# Patient Record
Sex: FEMALE | Race: BLACK OR AFRICAN AMERICAN | NOT HISPANIC OR LATINO | URBAN - METROPOLITAN AREA
[De-identification: names, ages, dates, MRNs, and addresses within clinical notes are randomized per-mention and may not be internally consistent; named-entity substitution may affect disease eponyms.]

---

## 2021-01-01 ENCOUNTER — INPATIENT (INPATIENT)
Facility: HOSPITAL | Age: 86
LOS: 0 days | End: 2021-12-16
Attending: INTERNAL MEDICINE | Admitting: INTERNAL MEDICINE
Payer: MEDICARE

## 2021-01-01 VITALS
DIASTOLIC BLOOD PRESSURE: 88 MMHG | TEMPERATURE: 97 F | HEIGHT: 62 IN | HEART RATE: 135 BPM | WEIGHT: 130.07 LBS | SYSTOLIC BLOOD PRESSURE: 118 MMHG | OXYGEN SATURATION: 96 % | RESPIRATION RATE: 26 BRPM

## 2021-01-01 DIAGNOSIS — E78.5 HYPERLIPIDEMIA, UNSPECIFIED: ICD-10-CM

## 2021-01-01 DIAGNOSIS — I23.5: ICD-10-CM

## 2021-01-01 DIAGNOSIS — I21.19 ST ELEVATION (STEMI) MYOCARDIAL INFARCTION INVOLVING OTHER CORONARY ARTERY OF INFERIOR WALL: ICD-10-CM

## 2021-01-01 DIAGNOSIS — R57.0 CARDIOGENIC SHOCK: ICD-10-CM

## 2021-01-01 DIAGNOSIS — I21.3 ST ELEVATION (STEMI) MYOCARDIAL INFARCTION OF UNSPECIFIED SITE: ICD-10-CM

## 2021-01-01 DIAGNOSIS — J96.01 ACUTE RESPIRATORY FAILURE WITH HYPOXIA: ICD-10-CM

## 2021-01-01 DIAGNOSIS — I34.0 NONRHEUMATIC MITRAL (VALVE) INSUFFICIENCY: ICD-10-CM

## 2021-01-01 DIAGNOSIS — Z66 DO NOT RESUSCITATE: ICD-10-CM

## 2021-01-01 DIAGNOSIS — I10 ESSENTIAL (PRIMARY) HYPERTENSION: ICD-10-CM

## 2021-01-01 DIAGNOSIS — Z51.5 ENCOUNTER FOR PALLIATIVE CARE: ICD-10-CM

## 2021-01-01 DIAGNOSIS — J81.1 CHRONIC PULMONARY EDEMA: ICD-10-CM

## 2021-01-01 LAB
ALBUMIN SERPL ELPH-MCNC: 3.6 G/DL — SIGNIFICANT CHANGE UP (ref 3.3–5)
ALP SERPL-CCNC: 88 U/L — SIGNIFICANT CHANGE UP (ref 40–120)
ALT FLD-CCNC: 67 U/L — SIGNIFICANT CHANGE UP (ref 12–78)
ANION GAP SERPL CALC-SCNC: 15 MMOL/L — SIGNIFICANT CHANGE UP (ref 5–17)
AST SERPL-CCNC: 247 U/L — HIGH (ref 15–37)
BASE EXCESS BLDA CALC-SCNC: -6.4 MMOL/L — LOW (ref -2–3)
BASOPHILS # BLD AUTO: 0.01 K/UL — SIGNIFICANT CHANGE UP (ref 0–0.2)
BASOPHILS NFR BLD AUTO: 0.1 % — SIGNIFICANT CHANGE UP (ref 0–2)
BILIRUB SERPL-MCNC: 0.9 MG/DL — SIGNIFICANT CHANGE UP (ref 0.2–1.2)
BLOOD GAS COMMENTS: SIGNIFICANT CHANGE UP
BLOOD GAS COMMENTS: SIGNIFICANT CHANGE UP
BUN SERPL-MCNC: 39 MG/DL — HIGH (ref 7–23)
CALCIUM SERPL-MCNC: 9.5 MG/DL — SIGNIFICANT CHANGE UP (ref 8.5–10.1)
CHLORIDE SERPL-SCNC: 107 MMOL/L — SIGNIFICANT CHANGE UP (ref 96–108)
CK MB BLD-MCNC: 15.4 % — HIGH (ref 0–3.5)
CK MB CFR SERPL CALC: 203.6 NG/ML — HIGH (ref 0.5–3.6)
CK SERPL-CCNC: 1326 U/L — HIGH (ref 26–192)
CO2 BLDA-SCNC: 19 MMOL/L — SIGNIFICANT CHANGE UP (ref 19–24)
CO2 SERPL-SCNC: 18 MMOL/L — LOW (ref 22–31)
CREAT SERPL-MCNC: 1.5 MG/DL — HIGH (ref 0.5–1.3)
EOSINOPHIL # BLD AUTO: 0 K/UL — SIGNIFICANT CHANGE UP (ref 0–0.5)
EOSINOPHIL NFR BLD AUTO: 0 % — SIGNIFICANT CHANGE UP (ref 0–6)
FLUAV AG NPH QL: SIGNIFICANT CHANGE UP
FLUBV AG NPH QL: SIGNIFICANT CHANGE UP
GLUCOSE SERPL-MCNC: 238 MG/DL — HIGH (ref 70–99)
HCO3 BLDA-SCNC: 18 MMOL/L — LOW (ref 21–28)
HCT VFR BLD CALC: 46.8 % — HIGH (ref 34.5–45)
HGB BLD-MCNC: 14.8 G/DL — SIGNIFICANT CHANGE UP (ref 11.5–15.5)
HOROWITZ INDEX BLDA+IHG-RTO: 100 — SIGNIFICANT CHANGE UP
IMM GRANULOCYTES NFR BLD AUTO: 0.3 % — SIGNIFICANT CHANGE UP (ref 0–1.5)
LYMPHOCYTES # BLD AUTO: 0.58 K/UL — LOW (ref 1–3.3)
LYMPHOCYTES # BLD AUTO: 6.6 % — LOW (ref 13–44)
MCHC RBC-ENTMCNC: 31.1 PG — SIGNIFICANT CHANGE UP (ref 27–34)
MCHC RBC-ENTMCNC: 31.6 GM/DL — LOW (ref 32–36)
MCV RBC AUTO: 98.3 FL — SIGNIFICANT CHANGE UP (ref 80–100)
MONOCYTES # BLD AUTO: 0.33 K/UL — SIGNIFICANT CHANGE UP (ref 0–0.9)
MONOCYTES NFR BLD AUTO: 3.8 % — SIGNIFICANT CHANGE UP (ref 2–14)
NEUTROPHILS # BLD AUTO: 7.81 K/UL — HIGH (ref 1.8–7.4)
NEUTROPHILS NFR BLD AUTO: 89.2 % — HIGH (ref 43–77)
NRBC # BLD: 0 /100 WBCS — SIGNIFICANT CHANGE UP (ref 0–0)
PCO2 BLDA: 33 MMHG — SIGNIFICANT CHANGE UP (ref 32–46)
PH BLD: 7.35 — SIGNIFICANT CHANGE UP (ref 7.35–7.45)
PLATELET # BLD AUTO: 211 K/UL — SIGNIFICANT CHANGE UP (ref 150–400)
PO2 BLDA: 109 MMHG — HIGH (ref 83–108)
POTASSIUM SERPL-MCNC: 4.3 MMOL/L — SIGNIFICANT CHANGE UP (ref 3.5–5.3)
POTASSIUM SERPL-SCNC: 4.3 MMOL/L — SIGNIFICANT CHANGE UP (ref 3.5–5.3)
PROT SERPL-MCNC: 8.7 GM/DL — HIGH (ref 6–8.3)
RBC # BLD: 4.76 M/UL — SIGNIFICANT CHANGE UP (ref 3.8–5.2)
RBC # FLD: 13.8 % — SIGNIFICANT CHANGE UP (ref 10.3–14.5)
SAO2 % BLDA: 99.1 % — HIGH (ref 94–98)
SARS-COV-2 RNA SPEC QL NAA+PROBE: SIGNIFICANT CHANGE UP
SODIUM SERPL-SCNC: 140 MMOL/L — SIGNIFICANT CHANGE UP (ref 135–145)
TROPONIN I, HIGH SENSITIVITY RESULT: SIGNIFICANT CHANGE UP NG/L
WBC # BLD: 8.76 K/UL — SIGNIFICANT CHANGE UP (ref 3.8–10.5)
WBC # FLD AUTO: 8.76 K/UL — SIGNIFICANT CHANGE UP (ref 3.8–10.5)

## 2021-01-01 PROCEDURE — 99291 CRITICAL CARE FIRST HOUR: CPT

## 2021-01-01 PROCEDURE — 93010 ELECTROCARDIOGRAM REPORT: CPT

## 2021-01-01 PROCEDURE — 71045 X-RAY EXAM CHEST 1 VIEW: CPT | Mod: 26

## 2021-01-01 RX ORDER — ASPIRIN/CALCIUM CARB/MAGNESIUM 324 MG
325 TABLET ORAL ONCE
Refills: 0 | Status: COMPLETED | OUTPATIENT
Start: 2021-01-01 | End: 2021-01-01

## 2021-01-01 RX ORDER — AMLODIPINE BESYLATE 2.5 MG/1
1 TABLET ORAL
Qty: 0 | Refills: 0 | DISCHARGE

## 2021-01-01 RX ORDER — ONDANSETRON 8 MG/1
4 TABLET, FILM COATED ORAL ONCE
Refills: 0 | Status: COMPLETED | OUTPATIENT
Start: 2021-01-01 | End: 2021-01-01

## 2021-01-01 RX ORDER — HEPARIN SODIUM 5000 [USP'U]/ML
INJECTION INTRAVENOUS; SUBCUTANEOUS
Qty: 25000 | Refills: 0 | Status: DISCONTINUED | OUTPATIENT
Start: 2021-01-01 | End: 2021-01-01

## 2021-01-01 RX ORDER — ATENOLOL 25 MG/1
1 TABLET ORAL
Qty: 0 | Refills: 0 | DISCHARGE

## 2021-01-01 RX ORDER — MORPHINE SULFATE 50 MG/1
0.5 CAPSULE, EXTENDED RELEASE ORAL
Qty: 100 | Refills: 0 | Status: DISCONTINUED | OUTPATIENT
Start: 2021-01-01 | End: 2021-01-01

## 2021-01-01 RX ORDER — MORPHINE SULFATE 50 MG/1
2 CAPSULE, EXTENDED RELEASE ORAL ONCE
Refills: 0 | Status: DISCONTINUED | OUTPATIENT
Start: 2021-01-01 | End: 2021-01-01

## 2021-01-01 RX ORDER — HEPARIN SODIUM 5000 [USP'U]/ML
3500 INJECTION INTRAVENOUS; SUBCUTANEOUS EVERY 6 HOURS
Refills: 0 | Status: DISCONTINUED | OUTPATIENT
Start: 2021-01-01 | End: 2021-01-01

## 2021-01-01 RX ORDER — HEPARIN SODIUM 5000 [USP'U]/ML
3500 INJECTION INTRAVENOUS; SUBCUTANEOUS ONCE
Refills: 0 | Status: COMPLETED | OUTPATIENT
Start: 2021-01-01 | End: 2021-01-01

## 2021-01-01 RX ORDER — CHLORHEXIDINE GLUCONATE 213 G/1000ML
1 SOLUTION TOPICAL
Refills: 0 | Status: DISCONTINUED | OUTPATIENT
Start: 2021-01-01 | End: 2021-01-01

## 2021-01-01 RX ADMIN — CHLORHEXIDINE GLUCONATE 1 APPLICATION(S): 213 SOLUTION TOPICAL at 15:55

## 2021-01-01 RX ADMIN — ONDANSETRON 4 MILLIGRAM(S): 8 TABLET, FILM COATED ORAL at 15:43

## 2021-01-01 RX ADMIN — MORPHINE SULFATE 2 MILLIGRAM(S): 50 CAPSULE, EXTENDED RELEASE ORAL at 15:30

## 2021-01-01 RX ADMIN — HEPARIN SODIUM 700 UNIT(S)/HR: 5000 INJECTION INTRAVENOUS; SUBCUTANEOUS at 10:38

## 2021-01-01 RX ADMIN — MORPHINE SULFATE 2 MILLIGRAM(S): 50 CAPSULE, EXTENDED RELEASE ORAL at 15:45

## 2021-01-01 RX ADMIN — Medication 325 MILLIGRAM(S): at 10:40

## 2021-01-01 RX ADMIN — HEPARIN SODIUM 3500 UNIT(S): 5000 INJECTION INTRAVENOUS; SUBCUTANEOUS at 10:37

## 2021-01-01 RX ADMIN — ONDANSETRON 4 MILLIGRAM(S): 8 TABLET, FILM COATED ORAL at 11:31

## 2021-12-16 NOTE — DISCHARGE NOTE FOR THE EXPIRED PATIENT - OTHER SIGNIFICANT FINDINGS
ADDENDUM:  Patient was in cardiogenic shock with severe mitral regurgitation. As a result, she had acute respiratory failure requiring NIPPV for pulmonary edema.

## 2021-12-16 NOTE — H&P ADULT - ASSESSMENT
89 yo F with a pmhx of HTN, HLD presents to the ED with b/l shoulder pain and SOB over the last 2 days. Her SOB worsened today, prompting her to come to the ED. She lives at home alone and is able to perform her ADLs. She sees a doctor regularly and only takes atenolol for her HTN. She is un-vaccinated for COVID. She denies any chest pain at bedside. She admits to mild nausea. She denies any abdominal pain, fevers, chills, HA, fnd, numbness or tingling.     Patient was arrived to the ED as a code STEMI, STEMI noted in the inferior wall in leads 2, 3, and AVF. Cardiology consult at NS was made and initial decision to transfer to [facility] was made. In the interim, patient was reassess, cardiology made final decision not to undergo PCI for STEMI given risks outweighs benefits for patient. decision was made to conservatively manage patient. Patient started on heparin drip. Patient was noted to hypoxemic to the 74% on 100% nrb; patient was upgraded to BIPAP satting in the high 90s. BP in the 110s systolic, MAP in the 80s. Patient admitted to CCU.    NEURO:  AAOx3, tolerating BIpap without difficulty  no active issues    CARDIO:  #STEMI  - MAP 80s, maintain MAPS above 70  - Heparin infusion with ptt goals for full AC  - trend trops and ckmb  - telemetry monitoring  - f/u TTE    RESP:  - CXR with R sided fullness, likely pulmonary edema  - covid negative, flu neg  - on BIPAP 12/6 100% 14; wean as tolerate  - CTM    GI:  -        89 yo F with a pmhx of HTN, HLD presents to the ED with b/l shoulder pain and SOB over the last 2 days. Her SOB worsened today, prompting her to come to the ED. She lives at home alone and is able to perform her ADLs. She sees a doctor regularly and only takes atenolol for her HTN. She is un-vaccinated for COVID. She denies any chest pain at bedside. She admits to mild nausea. She denies any abdominal pain, fevers, chills, HA, fnd, numbness or tingling.     Patient was arrived to the ED as a code STEMI, STEMI noted in the inferior wall in leads 2, 3, and AVF. Cardiology consult at NS was made and initial decision to transfer to [facility] was made. In the interim, patient was reassess, cardiology made final decision not to undergo PCI for STEMI given risks outweighs benefits for patient. decision was made to conservatively manage patient. Patient started on heparin drip. Patient was noted to hypoxemic to the 74% on 100% nrb; patient was upgraded to BIPAP satting in the high 90s. BP in the 110s systolic, MAP in the 80s. Patient admitted to CCU.    NEURO:  AAOx3, tolerating BIpap without difficulty  no active issues    CARDIO:  #STEMI  - MAP 80s, maintain MAPS above 70  - Heparin infusion with ptt goals for full AC  - trend trops and ckmb  - telemetry monitoring  - f/u TTE  - appreciate cardiology recs    RESP:  - CXR with R sided likely pulmonary edema  - covid negative, flu neg  - on BIPAP 12/6 100% 14; wean as tolerated  - CTM    GI:   - dysphagia screen, regular diet if passed  - no active issues    Endo:   - Glycemic control with insulin sliding scale as needed   - no active issues    Renal:   - monitor I and O's  - Monitor and correct electrolytes     ID:   - CTM for fevers  - CXR diffuse consolidation on R likely cardiogenic, not infectious  - no active issues      Heme:   - Repeat labs now including type and screen   - Trend hgb  - on full AC      Other/Disposition:  Patient admitted to the ICU  Code Status: Full code

## 2021-12-16 NOTE — H&P ADULT - ATTENDING COMMENTS
Agree with above  89F with STEMI, not candidate for Cardiac intervention  Brought to ICU on NIPPV  An hour ago, patient with increased non-specific pain and hypotension.  POCU/S showed scattered B-lines throughout  Hypokinetic anteroseptal wall on PSSA  MR jet on A4C with poor forward flow, VTI ~6  Contractility moderately diminished  Possible papillary muscle rupture after MI  Spoke to family at bedside. They agree that no intervention would change the overall trajectory of her disease course.  Patient will be DNR/DNI  Family asked us to "make her comfortable"  - No lab draws  - D/C heparin gtt  - morphine gtt for pain and dyspnea  - NIPPV as tolerated with breaks with NC  Poor overall prognosis.    I have personally provided 45 minutes of critical care time.

## 2021-12-16 NOTE — DISCHARGE NOTE FOR THE EXPIRED PATIENT - HOSPITAL COURSE
89 yo F with a pmhx of HTN, HLD presents to the ED with b/l shoulder pain and SOB over the last 2 days. Her SOB worsened today, prompting her to come to the ED. She lives at home alone and is able to perform her ADLs. She sees a doctor regularly and only takes atenolol for her HTN. She is un-vaccinated for COVID. She denies any chest pain at bedside. She admits to mild nausea. She denies any abdominal pain, fevers, chills, HA, fnd, numbness or tingling.     Patient was arrived to the ED as a code STEMI, STEMI noted in the inferior wall in leads 2, 3, and AVF. Cardiology consult at NS was made and initial decision to transfer to [facility] was made. In the interim, patient was reassess, cardiology made final decision not to undergo PCI for STEMI given risks outweighs benefits for patient. decision was made to conservatively manage patient. Patient started on heparin drip. Patient was noted to hypoxemic to the 74% on 100% nrb; patient was upgraded to BIPAP satting in the high 90s. BP in the 110s systolic, MAP in the 80s. Patient admitted to CCU.    Family decision made for DNR/DNI status and for comfort measures    Called to bedside to evaluate the patient for cardiopulmonary arrest    On physical exam, patient did not respond to verbal or noxious stimuli.  No spontaneous respirations.  Absent heart and breath sounds.  Absent radial and carotid pulses.   Pupils are fixed and dilated, no corneal reflex.  EKG rhythm strip shows asystole.   Patient pronounced dead at 16:18.  Attending notified.  Family denies autopsy.

## 2021-12-16 NOTE — H&P ADULT - NSHPREVIEWOFSYSTEMS_GEN_ALL_CORE
GENERAL: no fever, chills  HEENT: no cough, congestion, odynophagia, dysphagia  CARDIAC: no chest pain, palpitations, lightheadedness  PULM: (+) dyspnea, no wheezing   GI: no abdominal pain, nausea, vomiting, diarrhea, constipation, melena, hematochezia  : no urinary dysuria, frequency, incontinence, hematuria  NEURO: no headache, motor weakness, sensory changes  MSK: (+) b/l shoulder pain, no muscle pain  SKIN: no rashes, hives  HEME: no active bleeding, bruising

## 2021-12-16 NOTE — ED PROVIDER NOTE - CARE PLAN
Principal Discharge DX:	STEMI (ST elevation myocardial infarction)   1 Principal Discharge DX:	STEMI (ST elevation myocardial infarction)  Secondary Diagnosis:	Pneumonia

## 2021-12-16 NOTE — ED ADULT NURSE NOTE - OBJECTIVE STATEMENT
Patients son states patient has been having back and flank pain x2 days. Vomiting since Monday states shes been a little sob

## 2021-12-16 NOTE — PATIENT PROFILE ADULT - FALL HARM RISK - HARM RISK INTERVENTIONS
Assistance with ambulation/Assistance OOB with selected safe patient handling equipment/Communicate Risk of Fall with Harm to all staff/Reinforce activity limits and safety measures with patient and family/Tailored Fall Risk Interventions/Use of alarms - bed, chair and/or voice tab/Visual Cue: Yellow wristband and red socks/Bed in lowest position, wheels locked, appropriate side rails in place/Call bell, personal items and telephone in reach/Instruct patient to call for assistance before getting out of bed or chair/Non-slip footwear when patient is out of bed/[place] to call system/Physically safe environment - no spills, clutter or unnecessary equipment/Purposeful Proactive Rounding/Room/bathroom lighting operational, light cord in reach

## 2021-12-16 NOTE — ED ADULT TRIAGE NOTE - CHIEF COMPLAINT QUOTE
BIBA- from home, lives alone. Son called 911 not feeling well X2days  c/o back pain/bilat flank and vomiting with no [id number]  EMS POX74%,

## 2021-12-16 NOTE — CONSULT NOTE ADULT - SUBJECTIVE AND OBJECTIVE BOX
HERIBERTO QUIGLEY  [id number]    Date of Consult:  12/16/21  CHIEF COMPLAINT:  SOB  HISTORY OF PRESENT ILLNESS:  89F with a pmhx of HTN, HLD presents to the ED with b/l shoulder pain and SOB over the last 2 days. pt unable to give further details as she is in resp distres adn uncomfortable. family at bedside, state she started complaining of sob so they brought her in. pt lives alone and is able to perform her ADLs. She sees a doctor regularly.   on arrival to ED, inital EKG showing an inferior wall stemi.  the decision was made to conservatively manage patient. Patient started on heparin drip. Patient was noted to hypoxemic to the 74% on 100% nrb; patient was upgraded to BIPAP with o2 sat in the high 90s but then was unable to tolerate bipap. now tachypnic to 30s. cool extremities, MAP in the 50s. Patient admitted to CCU. per icu team, bedside focused echo showing severe MR.   pt family pt with no known valvular issues previously.        Allergies    No Known Allergies    Intolerances    	    MEDICATIONS:        morphine  Infusion 0.5 mG/Hr IV Continuous <Continuous>        chlorhexidine 2% Cloths 1 Application(s) Topical <User Schedule>      PAST MEDICAL & SURGICAL HISTORY:  Hypertension    HLD (hyperlipidemia)    No significant past surgical history        FAMILY HISTORY:      SOCIAL HISTORY:    unknown      REVIEW OF SYSTEMS:  See HPI. Otherwise, 10 point ROS done and otherwise negative.    PHYSICAL EXAM:  T(C): 37.3 (12-16-21 @ 15:00), Max: 37.4 (12-16-21 @ 10:45)  HR: 65 (12-16-21 @ 15:19) (62 - 135)  BP: 96/64 (12-16-21 @ 15:19) (60/48 - 129/58)  RR: 41 (12-16-21 @ 15:19) (14 - 42)  SpO2: 88% (12-16-21 @ 15:19) (73% - 100%)  Wt(kg): --  I&O's Summary      Appearance: Normal	  HEENT:   Normal oral mucosa, PERRL, EOMI	  Lymphatic: No lymphadenopathy  Cardiovascular: Normal S1 S2, No JVD, No murmurs, No edema  Respiratory: b/l rhonchi  Psychiatry: unable to assess  Gastrointestinal:  Soft, Non-tender, + BS	  Skin: No rashes, No ecchymoses, No cyanosis	  Neurologic: unable to assess  Extremities: cool to touch      LABS:	 	    CBC Full  -  ( 16 Dec 2021 09:59 )  WBC Count : 8.76 K/uL  Hemoglobin : 14.8 g/dL  Hematocrit : 46.8 %  Platelet Count - Automated : 211 K/uL  Mean Cell Volume : 98.3 fl  Mean Cell Hemoglobin : 31.1 pg  Mean Cell Hemoglobin Concentration : 31.6 gm/dL  Auto Neutrophil # : 7.81 K/uL  Auto Lymphocyte # : 0.58 K/uL  Auto Monocyte # : 0.33 K/uL  Auto Eosinophil # : 0.00 K/uL  Auto Basophil # : 0.01 K/uL  Auto Neutrophil % : 89.2 %  Auto Lymphocyte % : 6.6 %  Auto Monocyte % : 3.8 %  Auto Eosinophil % : 0.0 %  Auto Basophil % : 0.1 %    12-16    140  |  107  |  39<H>  ----------------------------<  238<H>  4.3   |  18<L>  |  1.50<H>    Ca    9.5      16 Dec 2021 09:59    TPro  8.7<H>  /  Alb  3.6  /  TBili  0.9  /  DBili  x   /  AST  247<H>  /  ALT  67  /  AlkPhos  88  12-16      proBNP:   Lipid Profile:   HgA1c:   TSH:       CARDIAC MARKERS:            TELEMETRY: 	    ECG:  	  RADIOLOGY:  OTHER: 	    PREVIOUS DIAGNOSTIC TESTING:    [ ] Echocardiogram:  [ ]  Catheterization:  [ ] Stress Test:  	  	  ASSESSMENT/PLAN: 	    Tian Avendano MD

## 2021-12-16 NOTE — ED PROVIDER NOTE - CONSTITUTIONAL, MLM
normal... Well appearing, awake, alert, oriented to person, place, time/situation and in no apparent distress, pt is able to speak in complete sentences and follow commands

## 2021-12-16 NOTE — ED PROVIDER NOTE - PROGRESS NOTE DETAILS
transfer center called, case discussed with interventionalis Dr Louie, two ekgs faxed and also emailed to him at [phone], states he will take a look at them, feels the risk outweighs the benefits for this patient, currently in a procedure, will call me back shortly dr sultana called back, ekg's reviewed, feels this is a late presentation of an MI, will take pt ER to ER for medical management and close observation pts initial o2 sat 94%, now informed o2 sat is 74% on 100%nrb, respiratory therapist called for bipap machine, cxr shows right side pna vs?covid icu called, case discussed with dr bryant Dr Mendez called back, labs reviewed, trop > 40,000, very late presentation for mi, recommend at this time to hold transfer, icu admission

## 2021-12-16 NOTE — H&P ADULT - HISTORY OF PRESENT ILLNESS
89 yo F with a pmhx of HTN, HLD presents to the ED with b/l shoulder pain and SOB over the last 2 days. Her SOB worsened today, prompting her to come to the ED. She lives at home alone and is able to perform her ADLs. She sees a doctor regularly and only takes atenolol for her HTN. She is un-vaccinated for COVID. She denies any chest pain at bedside. She admits to mild nausea. She denies any abdominal pain, fevers, chills, HA, fnd, numbness or tingling.     Patient was arrived to the ED as a code STEMI, STEMI noted in the inferior wall in leads 2, 3, and AVF. Cardiology consult at [facility] was made and initial decision to transfer to [facility] was made. In the interim, patient was reassess, cardiology made final decision not to undergo PCI for STEMI given risks outweighs benefits for patient. decision was made to conservatively manage patient. Patient started on heparin drip. Patient was noted to hypoxemic to the 74% on 100% nrb; patient was upgraded to BIPAP satting in the high 90s. BP in the 110s systolic, MAP in the 80s. Patient admitted to CCU.

## 2021-12-16 NOTE — ED ADULT NURSE REASSESSMENT NOTE - NS ED NURSE REASSESS COMMENT FT1
: break coverage; rec' d patient alert and oriented x 3. c/o nausea. remains on bipap. on CM in SR. VS documented. difficulty obtaining 02 due to cool extremities. Report given to ICU RN Melissa, patient pending transport to unit.

## 2021-12-16 NOTE — ED PROVIDER NOTE - OBJECTIVE STATEMENT
89 year old female with h/o HTN presents today biba from home accompanied with her son c/o bilateral shoulder pain x 2 days which did not improved today associated with sob, pt's son does also report that she did have a cough with vomiting two days ago, denies fevers or chills, or chest pain, today pt continues to deny chest pain, only reports bilateral upper shoulder pain, pt is currently unvaccinated, she does live alone and independent, currently cooks for herself and cares for herself, pt did have assistance at home which she refused,

## 2021-12-16 NOTE — CONSULT NOTE ADULT - ASSESSMENT
89F with a pmhx of HTN, HLD presents to the ED with b/l shoulder pain and SOB over the last 2 days found to have an acute IWSTEMI    STEMI  -given age and comorbidities, pt and family opting for medical mgmt  -with ?findings of severe MR in setting ion IWSTEMI, likely pt had a mechanical complication of a papillary muscle rupture  -on heparin drip now  -pt uncomfortable, is tachycardic, tachypneic, hypoxic and hypotensive with MAP <65.   -d/w family at bedside that she is critically ill and her chance of recovery from this event is very minimal  -family asking for pt to be made comfortable, pt is DNR  -cont supportive care per icu team, morphine drip being started now.

## 2021-12-16 NOTE — ED ADULT NURSE NOTE - CHIEF COMPLAINT QUOTE
BIBA- from home, lives alone. Son called 911 not feeling well X2days  c/o back pain/bilat flank and vomiting with no [id number]  EMS POX74%,